# Patient Record
Sex: FEMALE | Race: OTHER | Employment: FULL TIME | URBAN - METROPOLITAN AREA
[De-identification: names, ages, dates, MRNs, and addresses within clinical notes are randomized per-mention and may not be internally consistent; named-entity substitution may affect disease eponyms.]

---

## 2019-11-01 ENCOUNTER — HOSPITAL ENCOUNTER (OUTPATIENT)
Dept: SURGERY | Age: 29
Discharge: HOME | End: 2019-11-01

## 2019-11-04 ENCOUNTER — HOSPITAL ENCOUNTER (OUTPATIENT)
Dept: LAB | Age: 29
Discharge: HOME | End: 2019-11-04
Payer: COMMERCIAL

## 2019-11-04 VITALS — BODY MASS INDEX: 23.37 KG/M2 | HEIGHT: 62 IN | WEIGHT: 127 LBS

## 2019-11-04 LAB — HGB BLD-MCNC: 12.7 G/DL (ref 11.7–15.4)

## 2019-11-04 PROCEDURE — 85018 HEMOGLOBIN: CPT

## 2019-11-04 PROCEDURE — 36415 COLL VENOUS BLD VENIPUNCTURE: CPT

## 2019-11-04 RX ORDER — BISMUTH SUBSALICYLATE 262 MG
1 TABLET,CHEWABLE ORAL DAILY
COMMUNITY

## 2019-11-04 NOTE — PERIOP NOTES
Patient verified name and . Order for consent NOT found in EHR, unable to confirm case posting; patient verifies procedure. Type 1B surgery, PAT phone assessment complete. Orders NOT received. Labs per surgeon: No orders received at this time. Labs per anesthesia protocol: Hgb; order signed and held in EHR. Patient instructed to come to outpatient lab (entrance B) any weekday, prior to surgery, between 0830 and 1600 to have lab work completed. Patient verbalized understanding. Patient answered medical/surgical history questions at their best of ability. All prior to admission medications documented in [facility]. Patient instructed to take the following medications the day of surgery according to anesthesia guidelines with a small sip of water: None. Hold all vitamins/supplements/herbals 7 days prior to surgery and NSAIDS 5 days prior to surgery. Patient instructed on the following:  Arrive at [address], time of arrival to be called the day before by 1700  NPO after midnight including gum, mints, and ice chips  Responsible adult must drive patient to the hospital, stay during surgery, and patient will need supervision 24 hours after anesthesia  Use anti-bacterial soap in shower the night before surgery and on the morning of surgery  All piercings must be removed prior to arrival.    Leave all valuables (money and jewelry) at home but bring insurance card and ID on       DOS. Do not wear make-up, nail polish, lotions, cologne, perfumes, powders, or oil on skin. Patient teach back successful and patient demonstrates knowledge of instruction.

## 2019-11-04 NOTE — PERIOP NOTES
HGB done today WNL    Recent Results (from the past 12 hour(s))   HEMOGLOBIN    Collection Time: 11/04/19 11:35 AM   Result Value Ref Range    HGB 12.7 11.7 - 15.4 g/dL

## 2019-11-07 ENCOUNTER — ANESTHESIA EVENT (OUTPATIENT)
Dept: SURGERY | Age: 29
End: 2019-11-07
Payer: COMMERCIAL

## 2019-11-07 RX ORDER — ALBUTEROL SULFATE 0.83 MG/ML
2.5 SOLUTION RESPIRATORY (INHALATION) AS NEEDED
Status: CANCELLED | OUTPATIENT
Start: 2019-11-07

## 2019-11-07 RX ORDER — HYDROMORPHONE HYDROCHLORIDE 2 MG/ML
0.5 INJECTION, SOLUTION INTRAMUSCULAR; INTRAVENOUS; SUBCUTANEOUS
Status: CANCELLED | OUTPATIENT
Start: 2019-11-07

## 2019-11-07 RX ORDER — ONDANSETRON 2 MG/ML
4 INJECTION INTRAMUSCULAR; INTRAVENOUS
Status: CANCELLED | OUTPATIENT
Start: 2019-11-07 | End: 2019-11-08

## 2019-11-08 ENCOUNTER — ANESTHESIA (OUTPATIENT)
Dept: SURGERY | Age: 29
End: 2019-11-08
Payer: COMMERCIAL

## 2019-11-08 ENCOUNTER — APPOINTMENT (OUTPATIENT)
Dept: GENERAL RADIOLOGY | Age: 29
End: 2019-11-08
Attending: PODIATRIST
Payer: COMMERCIAL

## 2019-11-08 ENCOUNTER — HOSPITAL ENCOUNTER (OUTPATIENT)
Age: 29
Setting detail: OUTPATIENT SURGERY
Discharge: HOME | End: 2019-11-08
Attending: PODIATRIST | Admitting: PODIATRIST
Payer: COMMERCIAL

## 2019-11-08 VITALS
SYSTOLIC BLOOD PRESSURE: 98 MMHG | BODY MASS INDEX: 23.12 KG/M2 | OXYGEN SATURATION: 99 % | TEMPERATURE: 98 F | RESPIRATION RATE: 16 BRPM | HEART RATE: 67 BPM | DIASTOLIC BLOOD PRESSURE: 64 MMHG | WEIGHT: 126.4 LBS

## 2019-11-08 LAB — HCG UR QL: NEGATIVE

## 2019-11-08 PROCEDURE — C1713 ANCHOR/SCREW BN/BN,TIS/BN: HCPCS | Performed by: PODIATRIST

## 2019-11-08 PROCEDURE — 74011250636 HC RX REV CODE- 250/636: Performed by: PODIATRIST

## 2019-11-08 PROCEDURE — 77030003746: Performed by: PODIATRIST

## 2019-11-08 PROCEDURE — 77030037713 HC CLOSR DEV INCIS ZIP STRY -B: Performed by: PODIATRIST

## 2019-11-08 PROCEDURE — 74011250636 HC RX REV CODE- 250/636: Performed by: NURSE ANESTHETIST, CERTIFIED REGISTERED

## 2019-11-08 PROCEDURE — 74011000250 HC RX REV CODE- 250: Performed by: PODIATRIST

## 2019-11-08 PROCEDURE — 74011250636 HC RX REV CODE- 250/636: Performed by: ANESTHESIOLOGY

## 2019-11-08 PROCEDURE — 77030020269 HC MISC IMPL: Performed by: PODIATRIST

## 2019-11-08 PROCEDURE — 77030037715 HC DRSG ZIP STRY -B: Performed by: PODIATRIST

## 2019-11-08 PROCEDURE — 76060000035 HC ANESTHESIA 2 TO 2.5 HR: Performed by: PODIATRIST

## 2019-11-08 PROCEDURE — 77030006788 HC BLD SAW OSC STRY -B: Performed by: PODIATRIST

## 2019-11-08 PROCEDURE — 76210000017 HC OR PH I REC 1.5 TO 2 HR: Performed by: PODIATRIST

## 2019-11-08 PROCEDURE — 77030000032 HC CUF TRNQT ZIMM -B: Performed by: PODIATRIST

## 2019-11-08 PROCEDURE — 77030031139 HC SUT VCRL2 J&J -A: Performed by: PODIATRIST

## 2019-11-08 PROCEDURE — 81025 URINE PREGNANCY TEST: CPT

## 2019-11-08 PROCEDURE — 74011250637 HC RX REV CODE- 250/637: Performed by: ANESTHESIOLOGY

## 2019-11-08 PROCEDURE — 73630 X-RAY EXAM OF FOOT: CPT

## 2019-11-08 PROCEDURE — 77030008844 HC WRE K STRY -A: Performed by: PODIATRIST

## 2019-11-08 PROCEDURE — 76010000131 HC OR TIME 2 TO 2.5 HR: Performed by: PODIATRIST

## 2019-11-08 RX ORDER — DEXAMETHASONE SODIUM PHOSPHATE 100 MG/10ML
INJECTION INTRAMUSCULAR; INTRAVENOUS AS NEEDED
Status: DISCONTINUED | OUTPATIENT
Start: 2019-11-08 | End: 2019-11-08 | Stop reason: HOSPADM

## 2019-11-08 RX ORDER — DEXAMETHASONE SODIUM PHOSPHATE 4 MG/ML
INJECTION, SOLUTION INTRA-ARTICULAR; INTRALESIONAL; INTRAMUSCULAR; INTRAVENOUS; SOFT TISSUE AS NEEDED
Status: DISCONTINUED | OUTPATIENT
Start: 2019-11-08 | End: 2019-11-11 | Stop reason: HOSPADM

## 2019-11-08 RX ORDER — MIDAZOLAM HYDROCHLORIDE 1 MG/ML
2 INJECTION, SOLUTION INTRAMUSCULAR; INTRAVENOUS
Status: COMPLETED | OUTPATIENT
Start: 2019-11-08 | End: 2019-11-08

## 2019-11-08 RX ORDER — LIDOCAINE HYDROCHLORIDE 20 MG/ML
INJECTION, SOLUTION INFILTRATION; PERINEURAL AS NEEDED
Status: DISCONTINUED | OUTPATIENT
Start: 2019-11-08 | End: 2019-11-08 | Stop reason: HOSPADM

## 2019-11-08 RX ORDER — FENTANYL CITRATE 50 UG/ML
INJECTION, SOLUTION INTRAMUSCULAR; INTRAVENOUS AS NEEDED
Status: DISCONTINUED | OUTPATIENT
Start: 2019-11-08 | End: 2019-11-11 | Stop reason: HOSPADM

## 2019-11-08 RX ORDER — ONDANSETRON 2 MG/ML
INJECTION INTRAMUSCULAR; INTRAVENOUS AS NEEDED
Status: DISCONTINUED | OUTPATIENT
Start: 2019-11-08 | End: 2019-11-11 | Stop reason: HOSPADM

## 2019-11-08 RX ORDER — MIDAZOLAM HYDROCHLORIDE 1 MG/ML
INJECTION, SOLUTION INTRAMUSCULAR; INTRAVENOUS AS NEEDED
Status: DISCONTINUED | OUTPATIENT
Start: 2019-11-08 | End: 2019-11-11 | Stop reason: HOSPADM

## 2019-11-08 RX ORDER — PROPOFOL 10 MG/ML
INJECTION, EMULSION INTRAVENOUS
Status: DISCONTINUED | OUTPATIENT
Start: 2019-11-08 | End: 2019-11-11 | Stop reason: HOSPADM

## 2019-11-08 RX ORDER — LIDOCAINE HYDROCHLORIDE 10 MG/ML
0.1 INJECTION INFILTRATION; PERINEURAL AS NEEDED
Status: DISCONTINUED | OUTPATIENT
Start: 2019-11-08 | End: 2019-11-08 | Stop reason: HOSPADM

## 2019-11-08 RX ORDER — CEFAZOLIN SODIUM/WATER 2 G/20 ML
2 SYRINGE (ML) INTRAVENOUS ONCE
Status: DISCONTINUED | OUTPATIENT
Start: 2019-11-08 | End: 2019-11-08 | Stop reason: HOSPADM

## 2019-11-08 RX ORDER — BUPIVACAINE HYDROCHLORIDE 5 MG/ML
INJECTION, SOLUTION EPIDURAL; INTRACAUDAL AS NEEDED
Status: DISCONTINUED | OUTPATIENT
Start: 2019-11-08 | End: 2019-11-08 | Stop reason: HOSPADM

## 2019-11-08 RX ORDER — ACETAMINOPHEN 500 MG
1000 TABLET ORAL ONCE
Status: COMPLETED | OUTPATIENT
Start: 2019-11-08 | End: 2019-11-08

## 2019-11-08 RX ORDER — KETOROLAC TROMETHAMINE 30 MG/ML
INJECTION, SOLUTION INTRAMUSCULAR; INTRAVENOUS AS NEEDED
Status: DISCONTINUED | OUTPATIENT
Start: 2019-11-08 | End: 2019-11-11 | Stop reason: HOSPADM

## 2019-11-08 RX ORDER — PROPOFOL 10 MG/ML
INJECTION, EMULSION INTRAVENOUS AS NEEDED
Status: DISCONTINUED | OUTPATIENT
Start: 2019-11-08 | End: 2019-11-11 | Stop reason: HOSPADM

## 2019-11-08 RX ORDER — SODIUM CHLORIDE, SODIUM LACTATE, POTASSIUM CHLORIDE, CALCIUM CHLORIDE 600; 310; 30; 20 MG/100ML; MG/100ML; MG/100ML; MG/100ML
1000 INJECTION, SOLUTION INTRAVENOUS CONTINUOUS
Status: DISCONTINUED | OUTPATIENT
Start: 2019-11-08 | End: 2019-11-08 | Stop reason: HOSPADM

## 2019-11-08 RX ADMIN — PROPOFOL 30 MG: 10 INJECTION, EMULSION INTRAVENOUS at 14:28

## 2019-11-08 RX ADMIN — PROPOFOL 50 MG: 10 INJECTION, EMULSION INTRAVENOUS at 14:27

## 2019-11-08 RX ADMIN — FENTANYL CITRATE 50 MCG: 50 INJECTION INTRAMUSCULAR; INTRAVENOUS at 14:47

## 2019-11-08 RX ADMIN — DEXAMETHASONE SODIUM PHOSPHATE 4 MG: 4 INJECTION, SOLUTION INTRAMUSCULAR; INTRAVENOUS at 14:58

## 2019-11-08 RX ADMIN — KETOROLAC TROMETHAMINE 30 MG: 30 INJECTION, SOLUTION INTRAMUSCULAR; INTRAVENOUS at 16:07

## 2019-11-08 RX ADMIN — SODIUM CHLORIDE, SODIUM LACTATE, POTASSIUM CHLORIDE, AND CALCIUM CHLORIDE: 600; 310; 30; 20 INJECTION, SOLUTION INTRAVENOUS at 15:58

## 2019-11-08 RX ADMIN — MIDAZOLAM 1 MG: 1 INJECTION INTRAMUSCULAR; INTRAVENOUS at 14:29

## 2019-11-08 RX ADMIN — MIDAZOLAM 2 MG: 1 INJECTION INTRAMUSCULAR; INTRAVENOUS at 13:30

## 2019-11-08 RX ADMIN — ONDANSETRON 4 MG: 2 INJECTION INTRAMUSCULAR; INTRAVENOUS at 15:59

## 2019-11-08 RX ADMIN — SODIUM CHLORIDE, SODIUM LACTATE, POTASSIUM CHLORIDE, AND CALCIUM CHLORIDE 1000 ML: 600; 310; 30; 20 INJECTION, SOLUTION INTRAVENOUS at 12:01

## 2019-11-08 RX ADMIN — FENTANYL CITRATE 50 MCG: 50 INJECTION INTRAMUSCULAR; INTRAVENOUS at 14:30

## 2019-11-08 RX ADMIN — PROPOFOL 75 MCG/KG/MIN: 10 INJECTION, EMULSION INTRAVENOUS at 14:28

## 2019-11-08 RX ADMIN — ACETAMINOPHEN 1000 MG: 500 TABLET, FILM COATED ORAL at 12:01

## 2019-11-08 RX ADMIN — MIDAZOLAM 1 MG: 1 INJECTION INTRAMUSCULAR; INTRAVENOUS at 14:27

## 2019-11-08 NOTE — BRIEF OP NOTE
BRIEF OPERATIVE NOTE    Date of Procedure: 11/8/2019   Preoperative Diagnosis: Bunion [M21.619]  Postoperative Diagnosis: Bunion [M21.619]    Procedure(s):  RIGHT FOOT BUNIONECTOMY RIGHT 1ST METATARSAL CUNIEFORM ARTHRODESIS ADRIANA NEEDED/ROBBIE LAPIPLASTY TRAY/HAND TRAY/PODIATRY TRAY  Surgeon(s) and Role:     Tasneem Middleton DPM - Primary         Surgical Assistant: Dr. Sravan Ferraro DPM    Surgical Staff:  Circ-1: Chandrika Martinez RN  Scrub Tech-1: Carol Hamilton  Event Time In Time Out   Incision Start 1450    Incision Close 1637      Anesthesia: MAC   Estimated Blood Loss: Minimal  Specimens: * No specimens in log *   Findings: Elevated first metatarsal with atavistic medial cuneiform   Complications: None  Implants:   Implant Name Type Inv.  Item Serial No.  Lot No. LRB No. Used Action   PIN FIX ANCHORAGE --  - [id number]  PIN FIX ANCHORAGE --  [id number] [employer] [facility] [id number] Right 1 Implanted   LAG SCREW   [id number]  [id number] Right 1 Implanted   LOCKING SCREW   [id number]  [id number] Right 2 Implanted   LOCKING SCREW   [id number]  [id number] Right 1 Implanted   CORTEX SCREW   [id number]  [id number] Right 1 Implanted   CP PLATE, LAPIDUS   [id number]  [id number] Right 1 Implanted

## 2019-11-08 NOTE — ANESTHESIA PREPROCEDURE EVALUATION
Relevant Problems   No relevant active problems       Anesthetic History     PONV          Review of Systems / Medical History  Pertinent labs reviewed    Pulmonary  Within defined limits              Comments: Recent pneumonia   Neuro/Psych   Within defined limits           Cardiovascular                  Exercise tolerance: >4 METS     GI/Hepatic/Renal  Within defined limits              Endo/Other  Within defined limits          Comments: Polycystic ovary dz Other Findings              Physical Exam    Airway  Mallampati: II  TM Distance: > 6 cm  Neck ROM: normal range of motion   Mouth opening: Normal     Cardiovascular    Rhythm: regular           Dental         Pulmonary                 Abdominal  GI exam deferred       Other Findings            Anesthetic Plan    ASA: 2  Anesthesia type: total IV anesthesia          Induction: Intravenous  Anesthetic plan and risks discussed with: Patient

## 2019-11-08 NOTE — H&P
Patient: Sheyla Rehman MRN: [id number]  SSN: [id number]    YOB: 1990  Age: 26 y.o. Sex: female      History and Physical    Sheyla Rehman is a 26 y.o. female having Procedure(s):  RIGHT FOOT BUNIONECTOMY RIGHT 1ST METATARSAL CUNIEFORM ARTHRODESIS ADRIANA NEEDED/ROBBIE LAPIPLASTY TRAY/HAND TRAY/PODIATRY TRAY. Allergies: No Known Allergies     Chief Complaint: Pain and deformity R foot and large Toe    History of Present Illness: Increasing discomfort and deformity R foot and large Toe over past couple of years      Past Medical History:   Diagnosis Date    Anemia     Galactorrhea     Nausea & vomiting     Polycystic ovarian disease 2015    On Metformin       Past Surgical History:   Procedure Laterality Date    HX BREAST REDUCTION  2011    HX  SECTION      X2     HX LAP CHOLECYSTECTOMY        Family History   Problem Relation Age of Onset    Heart Disease Maternal Grandfather       Social History     Tobacco Use    Smoking status: Never Smoker    Smokeless tobacco: Never Used   Substance Use Topics    Alcohol use: Yes     Alcohol/week: 2.5 standard drinks     Types: 3 Glasses of wine per week     Comment: once a month        Prior to Admission medications    Medication Sig Start Date End Date Taking? Authorizing Provider   multivitamin (ONE A DAY) tablet Take 1 Tab by mouth daily. Yes Provider, Historical   metFORMIN (GLUCOPHAGE) 500 mg tablet TAKE ONE TABLET BY MOUTH TWICE A DAY 19  Yes MD DEISY Lawrenceinfantis-B.ani-B.long-B.bifi (PROBIOTIC 4X) 10-15 mg TbEC Take  by mouth.    Yes Provider, Historical        Visit Vitals  BP 95/62   Temp 36.8 °C (98.3 °F)   Resp 17   Wt 57.3 kg (126 lb 6.4 oz)   SpO2 99%   BMI 23.12 kg/m²        Assessment and Plan:   Sheyla Rehman is a 26 y.o. female having Procedure(s):  RIGHT FOOT BUNIONECTOMY RIGHT 1ST METATARSAL CUNIEFORM ARTHRODESIS ADRIANA NEEDED/ROBBIE LAPIPLASTY TRAY/HAND TRAY/PODIATRY TRAY for discomfort/deformity R foot    Preanesthesia Evaluation     Last edited 11/08/19 1306 by Pranav Staley MD  Date of Service 11/08/19 1305             Relevant Problems   No relevant active problems       Anesthetic History     PONV          Review of Systems / Medical History  Pertinent labs reviewed    Pulmonary  Within defined limits              Comments: Recent pneumonia   Neuro/Psych   Within defined limits           Cardiovascular                  Exercise tolerance: >4 METS     GI/Hepatic/Renal  Within defined limits              Endo/Other  Within defined limits          Comments: Polycystic ovary dz Other Findings              Physical Exam    Airway  Mallampati: II  TM Distance: > 6 cm  Neck ROM: normal range of motion   Mouth opening: Normal     Cardiovascular    Rhythm: regular           Dental         Pulmonary                 Abdominal  GI exam deferred       Other Findings            Anesthetic Plan    ASA: 2  Anesthesia type: total IV anesthesia          Induction: Intravenous  Anesthetic plan and risks discussed with: Patient               Preanesthesia evaluation performed by Pranav Staley MD            Signed By: Paty Rosales MD     November 8, 2019

## 2019-11-08 NOTE — DISCHARGE INSTRUCTIONS
Dr. Janet Dowell Specific Discharge Te[place]in. Keep dressing clean, dry and intact. If dressing becomes wet, please notify office. 2. Wear Walking boot when out of bed. OK to remove boot at night to sleep. 3. No weight bearing at this time. 4. Elevate operative foot. 5. Place a thin cloth over dressing and ice intermittently. (20 min/hour as tolerated)  6. Take prescription as prescribed. 7. Follow up in one week.

## 2019-11-09 NOTE — OP NOTES
Date of Procedure: 11/8/2019   Preoperative Diagnosis: Bunion [M21.619]  Postoperative Diagnosis: Bunion [M21.619]    Procedure(s):  RIGHT FOOT BUNIONECTOMY RIGHT 1ST METATARSAL CUNIEFORM ARTHRODESIS ADRIANA NEEDED/ROBBIE LAPIPLASTY TRAY/HAND TRAY/PODIATRY TRAY  Surgeon(s) and Role:     Marie Vizcaino DPM - Primary         Surgical Staff:  Circ-1: Maryse Bello RN  Scrub Tech-1: Efrain Plaza  Event Time In Time Out   Incision Start 1450    Incision Close 1637      Anesthesia: MAC   Estimated Blood Loss:minimal  Specimens: * No specimens in log *   Findings: atavistic medial cuneiform with hypertrophic bone formation and bunion deformity    Complications: none  Implants:   Implant Name Type Inv. Item Serial No.  Lot No. LRB No. Used Action   PIN FIX ANCHORAGE --  - [id number]  PIN FIX ANCHORAGE --  [id number] [employer] HOW [id number] Right 1 Implanted   LAG SCREW   [id number]  [id number] Right 1 Implanted   LOCKING SCREW   [id number]  [id number] Right 2 Implanted   LOCKING SCREW   [id number]  [id number] Right 1 Implanted   CORTEX SCREW   [id number]  [id number] Right 1 Implanted   CP PLATE, LAPIDUS   [id number]  [id number] Right 1 Implanted         Anesthesia: MAC with local 16 cc of 1:1 mix 2% lidocaine and 0.5% marcaine  Hemostasis: Ankle tourniquet at 250 mmHg x 90 minutes  Injectables:None  Condition: VSS    On 11/8/19 with informed consent signed and witnessed patient was removed from the pre operative area and taken to the operating theater where she was placed on the operating table in  Normal supine position. Once MAC anesthesia was induced the above mentioned local anesthetic was infiltrated in a regional fashion to the RLE at the level of the ankle. Tourniquet placed and patient was then prepped and draped using normal sterile technique and the procedure was begun as described below.      The tournqiuet was inflated and a longitudinal incision with a # 15 blade was made extending from the proximal medial cuneiform to midshaft of the 1st metatarsal on the medial aspect of the foot. Incision was carried down to the level of the deep fascia with blunt dissection and care taken to cauterize or retract any crossing neurovascular structures where appropriate. At the level of the deep fascia a deep fascial and perisoteal incision was made medial to the extensor hallucis tendon and the medial vein and vasculature. Exposing the bone and joint. Once complete exposure of the bone was made using a combination of sharp and blunt dissection an oscillating saw was used to remove the cartilage from both the metatarsal base and cuneiform. This was followed by hand resection with curette and rongeur as well as 0.062 K wire for removal of the subchondral bone plate as well as fenestration of the bone. The joint was then feathered using the oscillating saw and excellent bone on bone apposition was seen both clinically and using intra-operative fluoroscopy with adequate reduction of the deformity. The venita lapidus plate and screws were then placed following standard AO fixation technique. Placement was confirmed on intraoperative fluoroscopy and the surgical site was flushed with copious amounts of sterile saline. The deep fascia and perisoteum was closed using a running 3.0 vicryl. Residual deformity of the hallux was noted and incision was then carried down over the metatarsal head and capsuloraphy was performed of the 1st MTPJ. The foot was in an excellent position and the deep structures were closed using combination of 2.0, 3.0 and 4.0 vicryl. Tourniquet was deflated and then a Zipline closure was performed with excellent results. This was followed by xeroform and dry sterile dressing and patient was then placed in CAM walker surgical boot. Patient was taken from the operating theater with vital signs stable.  She will be discharged home once PACU protocol has been met with the following instructions. Elevate and ice the RLE for pain and swelling control. Take medications as prescribed. Keep the dressing to the right foot clean, dry and intact. No weight bearing to the right leg. Follow up in my office in 1 week for dressing change.

## 2019-11-13 NOTE — ADDENDUM NOTE
Addendum  created 11/13/19 1015 by Fuentes Hernandez MD    Attestation recorded in [address] [place] [address], [facility] 97 filed

## 2019-11-13 NOTE — ANESTHESIA POSTPROCEDURE EVALUATION
Procedure(s):  RIGHT FOOT BUNIONECTOMY RIGHT 1ST METATARSAL CUNIEFORM ARTHRODESIS ADRIANA NEEDED/ROBBIE LAPIPLASTY TRAY/HAND TRAY/PODIATRY TRAY.     total IV anesthesia    Anesthesia Post Evaluation      Multimodal analgesia: multimodal analgesia used between 6 hours prior to anesthesia start to PACU discharge  Patient location during evaluation: PACU  Patient participation: complete - patient participated  Level of consciousness: awake  Pain management: adequate  Airway patency: patent  Anesthetic complications: no  Cardiovascular status: acceptable  Respiratory status: acceptable  Hydration status: acceptable  Post anesthesia nausea and vomiting:  none      Vitals Value Taken Time   BP 98/64 11/8/2019  5:37 PM   Temp 36.7 °C (98 °F) 11/8/2019  4:52 PM   Pulse 67 11/8/2019  5:37 PM   Resp 16 11/8/2019  5:37 PM   SpO2 99 % 11/8/2019  5:37 PM

## (undated) DEVICE — BNDG,ELSTC,MATRIX,STRL,6"X5YD,LF,HOOK&LP: Brand: MEDLINE

## (undated) DEVICE — MASTISOL ADHESIVE LIQ 2/3ML

## (undated) DEVICE — PRECISION THIN (9.0 X 0.38 X 31.0MM)

## (undated) DEVICE — ZIP 16 SURGICAL SKIN CLOSURE DEVICE: Brand: ZIP 16 SURGICAL SKIN CLOSURE DEVICE

## (undated) DEVICE — SUTURE VCRL SZ 3-0 L18IN ABSRB UD PS-2 L19MM 3/8 CRV PRIM J497H

## (undated) DEVICE — ZIMMER® STERILE DISPOSABLE TOURNIQUET CUFF WITH PLC, DUAL PORT, SINGLE BLADDER, 42 IN. (107 CM)

## (undated) DEVICE — SUTURE VCRL SZ 4-0 L27IN ABSRB UD L19MM PS-2 3/8 CIR PRIM J426H

## (undated) DEVICE — FOOT & ANKLE SOFT DR WOMACK: Brand: MEDLINE INDUSTRIES, INC.

## (undated) DEVICE — CLOSURE SKIN FACILITATES COMPATIBILITY W/ CERTAIN IS DSG

## (undated) DEVICE — HOLDING PIN
Type: IMPLANTABLE DEVICE | Site: FOOT | Status: NON-FUNCTIONAL
Brand: ANCHORAGE
Removed: 2019-11-08

## (undated) DEVICE — DRILL BIT

## (undated) DEVICE — BANDAGE,GAUZE,BULKEE II,4.5"X4.1YD,STRL: Brand: MEDLINE

## (undated) DEVICE — X-RAY SPONGES,12 PLY: Brand: DERMACEA

## (undated) DEVICE — DRESSING,GAUZE,XEROFORM,CURAD,5"X9",ST: Brand: CURAD

## (undated) DEVICE — DRAPE SHT 3 QTR PROXIMA 53X77 --